# Patient Record
Sex: FEMALE | Race: BLACK OR AFRICAN AMERICAN | NOT HISPANIC OR LATINO | URBAN - NONMETROPOLITAN AREA
[De-identification: names, ages, dates, MRNs, and addresses within clinical notes are randomized per-mention and may not be internally consistent; named-entity substitution may affect disease eponyms.]

---

## 2020-06-04 ENCOUNTER — IMPORTED ENCOUNTER (OUTPATIENT)
Dept: URBAN - NONMETROPOLITAN AREA CLINIC 1 | Facility: CLINIC | Age: 45
End: 2020-06-04

## 2020-06-04 PROBLEM — E11.3511: Noted: 2020-06-04

## 2020-06-04 PROBLEM — H25.813: Noted: 2020-06-04

## 2020-06-04 PROBLEM — E11.3392: Noted: 2020-06-04

## 2020-06-04 PROBLEM — H52.4: Noted: 2020-06-04

## 2020-06-04 PROCEDURE — S0620 ROUTINE OPHTHALMOLOGICAL EXA: HCPCS

## 2020-06-04 NOTE — PATIENT DISCUSSION
IDDM (2005) Severe PDR OD and Moderate NPDR OS  - Discussed diagnosis in detail with patient- Stressed importance of good blood sugar control- Recommend no soda’s- Patient reports last A1C was 10 (Sometime last year) and blood sugars run between 130-200- NVD noted today OD along with cotton wool spots hemess scaffolding thickening and MA's. OS looks better but scattered dot blots through out the posterior pole - Recommend patient seeing retinal consultant but patient does not have insurance.  Explained to patient that she could go blind from diabetes being out of control - Letter to [employer] in 1970 [place] to monitorCombined Cataracts OU- Discussed diagnosis in detail with patient- Discussed signs and symptoms of progression- Discussed UV protection- Recommend catarct eval with Dr. Cristie Fothergill but would like to get patient's blood sugar and A1C down first. - Continue to monitorPresbyopia OU - Discussed diagnosis in detail with patient - Do not recommend getting glasses at this time - Continue to monitor

## 2022-04-09 ASSESSMENT — VISUAL ACUITY
OD_PH: 20/40-
OS_CC: 20/100
OD_CC: 20/50-
OS_PH: 20/40

## 2022-04-09 ASSESSMENT — TONOMETRY
OS_IOP_MMHG: 12
OD_IOP_MMHG: 13